# Patient Record
Sex: MALE | Race: WHITE | Employment: OTHER | URBAN - METROPOLITAN AREA
[De-identification: names, ages, dates, MRNs, and addresses within clinical notes are randomized per-mention and may not be internally consistent; named-entity substitution may affect disease eponyms.]

---

## 2019-07-29 ENCOUNTER — HOSPITAL ENCOUNTER (OUTPATIENT)
Dept: CT IMAGING | Age: 74
Discharge: HOME | End: 2019-07-29
Attending: INTERNAL MEDICINE
Payer: MEDICARE

## 2019-07-29 DIAGNOSIS — R91.8 MULTIPLE PULMONARY NODULES: ICD-10-CM

## 2019-07-29 PROCEDURE — 71250 CT THORAX DX C-: CPT

## 2021-05-12 ENCOUNTER — TRANSCRIBE ORDER (OUTPATIENT)
Dept: SCHEDULING | Age: 76
End: 2021-05-12

## 2021-05-12 DIAGNOSIS — R91.8 MULTIPLE LUNG NODULES ON CT: Primary | ICD-10-CM

## 2024-03-14 ENCOUNTER — HOSPITAL ENCOUNTER (OUTPATIENT)
Facility: HOSPITAL | Age: 79
Discharge: HOME | End: 2024-03-14
Payer: MEDICARE

## 2024-03-14 VITALS
SYSTOLIC BLOOD PRESSURE: 149 MMHG | DIASTOLIC BLOOD PRESSURE: 75 MMHG | HEART RATE: 83 BPM | TEMPERATURE: 98.8 F | HEIGHT: 71 IN | WEIGHT: 233 LBS | BODY MASS INDEX: 32.62 KG/M2 | RESPIRATION RATE: 20 BRPM

## 2024-03-14 DIAGNOSIS — R63.4 WEIGHT LOSS, UNINTENTIONAL: ICD-10-CM

## 2024-03-14 DIAGNOSIS — L89.323 PRESSURE INJURY OF LEFT BUTTOCK, STAGE 3 (HCC): Primary | ICD-10-CM

## 2024-03-14 PROCEDURE — 99203 OFFICE O/P NEW LOW 30 MIN: CPT

## 2024-03-14 PROCEDURE — 11042 DBRDMT SUBQ TIS 1ST 20SQCM/<: CPT

## 2024-03-14 RX ORDER — CARVEDILOL 25 MG/1
25 TABLET ORAL
COMMUNITY
Start: 2024-02-23

## 2024-03-14 RX ORDER — MELOXICAM 15 MG/1
15 TABLET ORAL DAILY
COMMUNITY
Start: 2018-03-15

## 2024-03-14 RX ORDER — OMEPRAZOLE 40 MG/1
CAPSULE, DELAYED RELEASE ORAL
COMMUNITY
Start: 2018-01-14

## 2024-03-14 RX ORDER — LIDOCAINE 40 MG/G
CREAM TOPICAL ONCE
OUTPATIENT
Start: 2024-03-14 | End: 2024-03-14

## 2024-03-14 RX ORDER — GINSENG 100 MG
CAPSULE ORAL ONCE
OUTPATIENT
Start: 2024-03-14 | End: 2024-03-14

## 2024-03-14 RX ORDER — PNV NO.95/FERROUS FUM/FOLIC AC 28MG-0.8MG
1 TABLET ORAL DAILY
COMMUNITY
Start: 2024-01-01

## 2024-03-14 RX ORDER — MONTELUKAST SODIUM 10 MG/1
10 TABLET ORAL DAILY
COMMUNITY
Start: 2024-02-21

## 2024-03-14 RX ORDER — GENTAMICIN SULFATE 1 MG/G
OINTMENT TOPICAL ONCE
OUTPATIENT
Start: 2024-03-14 | End: 2024-03-14

## 2024-03-14 RX ORDER — SITAGLIPTIN 100 MG/1
TABLET, FILM COATED ORAL
COMMUNITY
Start: 2018-02-05

## 2024-03-14 RX ORDER — LIDOCAINE HYDROCHLORIDE 20 MG/ML
JELLY TOPICAL ONCE
OUTPATIENT
Start: 2024-03-14 | End: 2024-03-14

## 2024-03-14 RX ORDER — CLOBETASOL PROPIONATE 0.5 MG/G
OINTMENT TOPICAL ONCE
OUTPATIENT
Start: 2024-03-14 | End: 2024-03-14

## 2024-03-14 RX ORDER — MIRABEGRON 50 MG/1
50 TABLET, FILM COATED, EXTENDED RELEASE ORAL DAILY
COMMUNITY
Start: 2024-01-28

## 2024-03-14 RX ORDER — BACITRACIN ZINC AND POLYMYXIN B SULFATE 500; 1000 [USP'U]/G; [USP'U]/G
OINTMENT TOPICAL ONCE
OUTPATIENT
Start: 2024-03-14 | End: 2024-03-14

## 2024-03-14 RX ORDER — PRAVASTATIN SODIUM 20 MG
20 TABLET ORAL
COMMUNITY
Start: 2018-02-05

## 2024-03-14 RX ORDER — TRIAMCINOLONE ACETONIDE 1 MG/G
OINTMENT TOPICAL ONCE
OUTPATIENT
Start: 2024-03-14 | End: 2024-03-14

## 2024-03-14 RX ORDER — AMLODIPINE BESYLATE 10 MG/1
10 TABLET ORAL
COMMUNITY
Start: 2018-01-14

## 2024-03-14 RX ORDER — SODIUM CHLOR/HYPOCHLOROUS ACID 0.033 %
SOLUTION, IRRIGATION IRRIGATION ONCE
OUTPATIENT
Start: 2024-03-14 | End: 2024-03-14

## 2024-03-14 RX ORDER — LEVOTHYROXINE SODIUM 0.1 MG/1
100 TABLET ORAL
COMMUNITY

## 2024-03-14 RX ORDER — BETAMETHASONE DIPROPIONATE 0.5 MG/G
CREAM TOPICAL ONCE
OUTPATIENT
Start: 2024-03-14 | End: 2024-03-14

## 2024-03-14 RX ORDER — IBUPROFEN 200 MG
TABLET ORAL ONCE
OUTPATIENT
Start: 2024-03-14 | End: 2024-03-14

## 2024-03-14 RX ORDER — LIDOCAINE HYDROCHLORIDE 40 MG/ML
SOLUTION TOPICAL ONCE
OUTPATIENT
Start: 2024-03-14 | End: 2024-03-14

## 2024-03-14 RX ORDER — FLUTICASONE PROPIONATE AND SALMETEROL 100; 50 UG/1; UG/1
POWDER RESPIRATORY (INHALATION)
COMMUNITY
Start: 2021-07-15

## 2024-03-14 RX ORDER — LIDOCAINE 50 MG/G
OINTMENT TOPICAL ONCE
OUTPATIENT
Start: 2024-03-14 | End: 2024-03-14
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
## 2024-03-14 NOTE — FLOWSHEET NOTE
03/14/24 1542   Wound 03/14/24 Buttocks Left #1   Date First Assessed/Time First Assessed: 03/14/24 1504   Present on Original Admission: Yes  Wound Approximate Age at First Assessment (Weeks): (c)   Primary Wound Type: Pressure Injury  Location: Buttocks  Wound Location Orientation: Left  Wound Desc...   Dressing Status New dressing applied   Dressing/Treatment Other (comment)  (skin prep, duoderm)     Discharge Condition: Stable    Pain: 0    Ambulatory Status:Walking    Discharge Destination: Home    Transportation:Car    Accompanied by: Self    Discharge instructions reviewed with Self and copy or written instructions have been provided. All questions/concerns have been addressed at this time.

## 2024-03-14 NOTE — FLOWSHEET NOTE
03/14/24 1505   Wound 03/14/24 Buttocks Left #1   Date First Assessed/Time First Assessed: 03/14/24 1504   Present on Original Admission: Yes  Wound Approximate Age at First Assessment (Weeks): (c)   Primary Wound Type: Pressure Injury  Location: Buttocks  Wound Location Orientation: Left  Wound Desc...   Wound Image    Wound Cleansed Cleansed with saline   Wound Length (cm) 2 cm   Wound Width (cm) 1.2 cm   Wound Depth (cm) 0.2 cm   Wound Surface Area (cm^2) 2.4 cm^2   Wound Volume (cm^3) 0.48 cm^3   Wound Assessment Slough;Pink/red   Drainage Amount Scant (moist but unmeasurable)   Drainage Description Serosanguinous   Odor None   Negrita-wound Assessment Blanchable erythema   Margins Epibole (rolled edges);Flat/open edges   Wound Thickness Description not for Pressure Injury Full thickness     BP (!) 149/75   Pulse 83   Temp 98.8 °F (37.1 °C) (Temporal)   Resp 20   Ht 1.803 m (5' 11\")   Wt 105.7 kg (233 lb)   BMI 32.50 kg/m²

## 2024-03-14 NOTE — PLAN OF CARE
(rolled edges);Flat/open edges 03/14/24 1505   Wound Thickness Description not for Pressure Injury Full thickness 03/14/24 1505   Number of days: 0          Supplies Requested :      WOUND #: 1   PRIMARY DRESSING:    Hydrocolloid thin - Duoderm brand is possible        FREQUENCY OF DRESSING CHANGES:  Three times per week       ADDITIONAL ITEMS:  [] Gloves Small  [x] Gloves Medium [] Gloves Large [] Gloves XLarge  [] Tape 1\" [x] Tape 2\" [] Tape 3\"  [] Medipore Tape  [x] Saline  [] Vashe  [x] Skin Prep   [] Adhesive Remover   [] Cotton Tip Applicators   [] Other:    Patient Wound(s) Debrided: [x] Yes if yes please add date 3/14/2024   [] No    Debribement Type: Excisional/Sharp    Patient currently being seen by Home Health: [] Yes   [x] No    Duration for needed supplies:  []15  [x]30  []60  []90 Days    Electronically signed by Barbara Stack RN on 3/14/2024 at 3:36 PM     Provider Information:      PROVIDER'S NAME: Nohemi Mark MD    NPI: [id number]

## 2024-03-14 NOTE — PATIENT INSTRUCTIONS
position directing pressure on wound site.      Dietary:  [x] Diabetic Diet   [x] Increase Protein: examples (Meat, cheese, eggs, greek yogurt, fish, nuts)   [] Boby Therapeutic Nutrition Powder  [] Dial a Dietician : Call [employer] at [phone] enter code (249) when prompted. M-F 9am-5pm EST.     Return Appointment:  [] Nurse Visit at wound center   [x] Return Appointment: With Dr. Nohemi Mark in 1 week(s)  [] Ordered tests:     Electronically signed on 3/14/2024 at 3:24 PM     PLEASE NOTE: IF YOU ARE UNABLE TO OBTAIN WOUND SUPPLIES, CONTINUE TO USE THE SUPPLIES YOU HAVE AVAILABLE UNTIL YOU ARE ABLE TO REACH US. IT IS MOST IMPORTANT TO KEEP THE WOUND COVERED AT ALL TIMES.     Physician Signature:_______________________  Dr.Silky Mark

## 2024-03-21 ENCOUNTER — HOSPITAL ENCOUNTER (OUTPATIENT)
Facility: HOSPITAL | Age: 79
Discharge: HOME | End: 2024-03-21
Payer: MEDICARE

## 2024-03-21 VITALS
SYSTOLIC BLOOD PRESSURE: 126 MMHG | DIASTOLIC BLOOD PRESSURE: 60 MMHG | TEMPERATURE: 97.9 F | RESPIRATION RATE: 18 BRPM | HEART RATE: 84 BPM

## 2024-03-21 DIAGNOSIS — L89.323 PRESSURE INJURY OF LEFT BUTTOCK, STAGE 3 (HCC): Primary | ICD-10-CM

## 2024-03-21 PROCEDURE — 11042 DBRDMT SUBQ TIS 1ST 20SQCM/<: CPT

## 2024-03-21 RX ORDER — LIDOCAINE HYDROCHLORIDE 40 MG/ML
SOLUTION TOPICAL ONCE
OUTPATIENT
Start: 2024-03-21 | End: 2024-03-21

## 2024-03-21 RX ORDER — SODIUM CHLOR/HYPOCHLOROUS ACID 0.033 %
SOLUTION, IRRIGATION IRRIGATION ONCE
OUTPATIENT
Start: 2024-03-21 | End: 2024-03-21

## 2024-03-21 RX ORDER — BACITRACIN ZINC AND POLYMYXIN B SULFATE 500; 1000 [USP'U]/G; [USP'U]/G
OINTMENT TOPICAL ONCE
OUTPATIENT
Start: 2024-03-21 | End: 2024-03-21

## 2024-03-21 RX ORDER — GENTAMICIN SULFATE 1 MG/G
OINTMENT TOPICAL ONCE
OUTPATIENT
Start: 2024-03-21 | End: 2024-03-21

## 2024-03-21 RX ORDER — LIDOCAINE HYDROCHLORIDE 20 MG/ML
JELLY TOPICAL ONCE
OUTPATIENT
Start: 2024-03-21 | End: 2024-03-21

## 2024-03-21 RX ORDER — GINSENG 100 MG
CAPSULE ORAL ONCE
OUTPATIENT
Start: 2024-03-21 | End: 2024-03-21

## 2024-03-21 RX ORDER — CLOBETASOL PROPIONATE 0.5 MG/G
OINTMENT TOPICAL ONCE
OUTPATIENT
Start: 2024-03-21 | End: 2024-03-21

## 2024-03-21 RX ORDER — IBUPROFEN 200 MG
TABLET ORAL ONCE
OUTPATIENT
Start: 2024-03-21 | End: 2024-03-21

## 2024-03-21 RX ORDER — TRIAMCINOLONE ACETONIDE 1 MG/G
OINTMENT TOPICAL ONCE
OUTPATIENT
Start: 2024-03-21 | End: 2024-03-21

## 2024-03-21 RX ORDER — BETAMETHASONE DIPROPIONATE 0.5 MG/G
CREAM TOPICAL ONCE
OUTPATIENT
Start: 2024-03-21 | End: 2024-03-21

## 2024-03-21 RX ORDER — LIDOCAINE 40 MG/G
CREAM TOPICAL ONCE
OUTPATIENT
Start: 2024-03-21 | End: 2024-03-21

## 2024-03-21 RX ORDER — LIDOCAINE 50 MG/G
OINTMENT TOPICAL ONCE
OUTPATIENT
Start: 2024-03-21 | End: 2024-03-21
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
## 2024-03-21 NOTE — FLOWSHEET NOTE
03/21/24 1127   Anesthetic   Anesthetic 4% Lidocaine Liquid Topical   Wound 03/14/24 Buttocks Left #1   Date First Assessed/Time First Assessed: 03/14/24 1504   Present on Original Admission: Yes  Wound Approximate Age at First Assessment (Weeks): (c)   Primary Wound Type: Pressure Injury  Location: Buttocks  Wound Location Orientation: Left  Wound Desc...   Wound Image    Dressing Status Old drainage noted   Wound Cleansed Cleansed with saline   Wound Length (cm) 1.7 cm   Wound Width (cm) 1.3 cm   Wound Depth (cm) 0.2 cm   Wound Surface Area (cm^2) 2.21 cm^2   Change in Wound Size % (l*w) 7.92   Wound Volume (cm^3) 0.442 cm^3   Wound Healing % 8   Wound Assessment Slough;Pink/red   Drainage Amount Small (< 25%)   Drainage Description Serous   Odor None   Negrita-wound Assessment Blanchable erythema   Margins Flat/open edges   Wound Thickness Description not for Pressure Injury Full thickness     /60   Pulse 84   Temp 97.9 °F (36.6 °C) (Temporal)   Resp 18

## 2024-03-21 NOTE — PATIENT INSTRUCTIONS
position directing pressure on wound site.      Dietary:  [x] Diabetic Diet   [x] Increase Protein: examples (Meat, cheese, eggs, greek yogurt, fish, nuts)   [] Boby Therapeutic Nutrition Powder  [] Dial a Dietician : Call [employer] at [phone] enter code (249) when prompted. M-F 9am-5pm EST.     Return Appointment:  [] Nurse Visit at wound center   [x] Return Appointment: With Dr. Nohemi Mark in 1 week(s)  [] Ordered tests:     Electronically signed on 3/21/2024 at 11:46 AM     PLEASE NOTE: IF YOU ARE UNABLE TO OBTAIN WOUND SUPPLIES, CONTINUE TO USE THE SUPPLIES YOU HAVE AVAILABLE UNTIL YOU ARE ABLE TO REACH US. IT IS MOST IMPORTANT TO KEEP THE WOUND COVERED AT ALL TIMES.     Physician Signature:_______________________  Dr.Silky Mark

## 2024-03-28 ENCOUNTER — HOSPITAL ENCOUNTER (OUTPATIENT)
Facility: HOSPITAL | Age: 79
Discharge: HOME | End: 2024-03-28
Payer: MEDICARE

## 2024-03-28 VITALS
HEART RATE: 92 BPM | DIASTOLIC BLOOD PRESSURE: 74 MMHG | RESPIRATION RATE: 17 BRPM | TEMPERATURE: 97 F | SYSTOLIC BLOOD PRESSURE: 154 MMHG

## 2024-03-28 DIAGNOSIS — L89.323 PRESSURE INJURY OF LEFT BUTTOCK, STAGE 3 (HCC): Primary | ICD-10-CM

## 2024-03-28 PROCEDURE — 11042 DBRDMT SUBQ TIS 1ST 20SQCM/<: CPT

## 2024-03-28 RX ORDER — GENTAMICIN SULFATE 1 MG/G
OINTMENT TOPICAL ONCE
OUTPATIENT
Start: 2024-03-28 | End: 2024-03-28

## 2024-03-28 RX ORDER — GINSENG 100 MG
CAPSULE ORAL ONCE
OUTPATIENT
Start: 2024-03-28 | End: 2024-03-28

## 2024-03-28 RX ORDER — CLOBETASOL PROPIONATE 0.5 MG/G
OINTMENT TOPICAL ONCE
OUTPATIENT
Start: 2024-03-28 | End: 2024-03-28

## 2024-03-28 RX ORDER — LIDOCAINE 50 MG/G
OINTMENT TOPICAL ONCE
OUTPATIENT
Start: 2024-03-28 | End: 2024-03-28

## 2024-03-28 RX ORDER — IBUPROFEN 200 MG
TABLET ORAL ONCE
OUTPATIENT
Start: 2024-03-28 | End: 2024-03-28

## 2024-03-28 RX ORDER — LIDOCAINE HYDROCHLORIDE 20 MG/ML
JELLY TOPICAL ONCE
OUTPATIENT
Start: 2024-03-28 | End: 2024-03-28

## 2024-03-28 RX ORDER — LIDOCAINE HYDROCHLORIDE 40 MG/ML
SOLUTION TOPICAL ONCE
OUTPATIENT
Start: 2024-03-28 | End: 2024-03-28

## 2024-03-28 RX ORDER — BACITRACIN ZINC AND POLYMYXIN B SULFATE 500; 1000 [USP'U]/G; [USP'U]/G
OINTMENT TOPICAL ONCE
OUTPATIENT
Start: 2024-03-28 | End: 2024-03-28

## 2024-03-28 RX ORDER — LIDOCAINE 40 MG/G
CREAM TOPICAL ONCE
OUTPATIENT
Start: 2024-03-28 | End: 2024-03-28

## 2024-03-28 RX ORDER — BETAMETHASONE DIPROPIONATE 0.5 MG/G
CREAM TOPICAL ONCE
OUTPATIENT
Start: 2024-03-28 | End: 2024-03-28

## 2024-03-28 RX ORDER — TRIAMCINOLONE ACETONIDE 1 MG/G
OINTMENT TOPICAL ONCE
OUTPATIENT
Start: 2024-03-28 | End: 2024-03-28

## 2024-03-28 RX ORDER — SODIUM CHLOR/HYPOCHLOROUS ACID 0.033 %
SOLUTION, IRRIGATION IRRIGATION ONCE
OUTPATIENT
Start: 2024-03-28 | End: 2024-03-28

## 2024-03-28 NOTE — FLOWSHEET NOTE
03/28/24 1139   Wound 03/14/24 Buttocks Left #1   Date First Assessed/Time First Assessed: 03/14/24 1504   Present on Original Admission: Yes  Wound Approximate Age at First Assessment (Weeks): (c)   Primary Wound Type: Pressure Injury  Location: Buttocks  Wound Location Orientation: Left  Wound Desc...   Dressing/Treatment Hydrocolloid     Discharge Condition: Stable    Pain: 0    Ambulatory Status:Walking    Discharge Destination: Home    Transportation:Car    Accompanied by: Self    Discharge instructions reviewed with Self and copy or written instructions have been provided. All questions/concerns have been addressed at this time.

## 2024-03-28 NOTE — FLOWSHEET NOTE
03/28/24 1102   Anesthetic   Anesthetic 4% Lidocaine Liquid Topical   Wound 03/14/24 Buttocks Left #1   Date First Assessed/Time First Assessed: 03/14/24 1504   Present on Original Admission: Yes  Wound Approximate Age at First Assessment (Weeks): (c)   Primary Wound Type: Pressure Injury  Location: Buttocks  Wound Location Orientation: Left  Wound Desc...   Wound Image    Dressing Status Old drainage noted   Wound Cleansed Soap and water   Wound Length (cm) 1.2 cm   Wound Width (cm) 0.6 cm   Wound Depth (cm) 0.1 cm   Wound Surface Area (cm^2) 0.72 cm^2   Change in Wound Size % (l*w) 70   Wound Volume (cm^3) 0.072 cm^3   Wound Healing % 85   Wound Assessment Slough;Pink/red   Drainage Amount Scant (moist but unmeasurable)   Drainage Description Serous   Odor None   Negrita-wound Assessment Blanchable erythema;Maceration   Margins Epibole (rolled edges)   Wound Thickness Description not for Pressure Injury Partial thickness     BP (!) 154/74   Pulse 92   Temp 97 °F (36.1 °C) (Temporal)   Resp 17

## 2024-03-28 NOTE — PATIENT INSTRUCTIONS
position directing pressure on wound site.      Dietary:  [x] Diabetic Diet   [x] Increase Protein: examples (Meat, cheese, eggs, greek yogurt, fish, nuts)   [] Boby Therapeutic Nutrition Powder  [] Dial a Dietician : Call [employer] at [phone] enter code (249) when prompted. M-F 9am-5pm EST.     Return Appointment:  [] Nurse Visit at wound center   [x] Return Appointment: With Dr. Nohemi Mark in 1 week(s)  [] Ordered tests:     Electronically signed on 3/28/2024 at 8:01 AM     PLEASE NOTE: IF YOU ARE UNABLE TO OBTAIN WOUND SUPPLIES, CONTINUE TO USE THE SUPPLIES YOU HAVE AVAILABLE UNTIL YOU ARE ABLE TO REACH US. IT IS MOST IMPORTANT TO KEEP THE WOUND COVERED AT ALL TIMES.     Physician Signature:_______________________  Dr.Silky Mark

## 2024-03-28 NOTE — PROGRESS NOTES
good  Psych: cooperative. No anxiety or depression. Normal mood and affect.  Neuro: alert and oriented to person/place/situation. Otherwise nonfocal.  Derm: Normal  turgor for age, dry skin  HEENT: Normocephalic, atraumatic. EOMI. Conjunctiva clear. No scleral icterus.  Neck: Normal range of motion.   Chest: Respirations nonlabored  Lower extremities: color normal; temperature normal.   Ulcer Description:   See Flowsheet           Data Review:              Procedure:     Ulcer assessment: Due to presence of necrotic tissue within the wound bed, ulcer requires debridement.    Procedure: Debridement:   The indication for debridement was reviewed with patient. Risks of procedure (bleeding, infection, pain) were discussed with patient/ and consent signed on first visit. Questions were answered      Subcutaneous excisional debridement Left buttock ulcer   Indication: to remove necrotic tissue/ vitalized and devitalized tissue/ infected tissue through skin and subcutaneous layer of wound bed  Time out done  Consent in chart   Anesthesia: Topical  lidocaine   Instrument: curette   Residual Necrosis: Present and scored   Bleeding: <1ml   Hemostasis: Pressure   Patient tolerated procedure well   Procedural Pain: none  Post - procedural pain: none    Post debridement measurements: see below  Surface area debrided: 0.72sq. cm        -------  Wound 03/14/24 Buttocks Left #1 (Active)   Wound Image   03/28/24 1102   Wound Etiology Pressure Stage 3 03/14/24 1522   Dressing Status Old drainage noted 03/28/24 1102   Wound Cleansed Soap and water 03/28/24 1102   Dressing/Treatment Hydrocolloid 03/28/24 1139   Wound Length (cm) 1.2 cm 03/28/24 1102   Wound Width (cm) 0.6 cm 03/28/24 1102   Wound Depth (cm) 0.1 cm 03/28/24 1102   Wound Surface Area (cm^2) 0.72 cm^2 03/28/24 1102   Change in Wound Size % (l*w) 70 03/28/24 1102   Wound Volume (cm^3) 0.072 cm^3 03/28/24 1102   Wound Healing % 85 03/28/24 1102   Post-Procedure Length (cm)

## 2024-04-04 ENCOUNTER — HOSPITAL ENCOUNTER (OUTPATIENT)
Facility: HOSPITAL | Age: 79
Discharge: HOME | End: 2024-04-04
Payer: MEDICARE

## 2024-04-04 VITALS
DIASTOLIC BLOOD PRESSURE: 79 MMHG | RESPIRATION RATE: 16 BRPM | SYSTOLIC BLOOD PRESSURE: 132 MMHG | HEART RATE: 87 BPM | TEMPERATURE: 97.7 F

## 2024-04-04 DIAGNOSIS — L89.323 PRESSURE INJURY OF LEFT BUTTOCK, STAGE 3 (HCC): Primary | ICD-10-CM

## 2024-04-04 PROCEDURE — 97597 DBRDMT OPN WND 1ST 20 CM/<: CPT

## 2024-04-04 RX ORDER — CLOBETASOL PROPIONATE 0.5 MG/G
OINTMENT TOPICAL ONCE
OUTPATIENT
Start: 2024-04-04 | End: 2024-04-04

## 2024-04-04 RX ORDER — GINSENG 100 MG
CAPSULE ORAL ONCE
OUTPATIENT
Start: 2024-04-04 | End: 2024-04-04

## 2024-04-04 RX ORDER — TRIAMCINOLONE ACETONIDE 1 MG/G
OINTMENT TOPICAL ONCE
OUTPATIENT
Start: 2024-04-04 | End: 2024-04-04

## 2024-04-04 RX ORDER — SODIUM CHLOR/HYPOCHLOROUS ACID 0.033 %
SOLUTION, IRRIGATION IRRIGATION ONCE
OUTPATIENT
Start: 2024-04-04 | End: 2024-04-04

## 2024-04-04 RX ORDER — LIDOCAINE HYDROCHLORIDE 20 MG/ML
JELLY TOPICAL ONCE
OUTPATIENT
Start: 2024-04-04 | End: 2024-04-04

## 2024-04-04 RX ORDER — BACITRACIN ZINC AND POLYMYXIN B SULFATE 500; 1000 [USP'U]/G; [USP'U]/G
OINTMENT TOPICAL ONCE
OUTPATIENT
Start: 2024-04-04 | End: 2024-04-04

## 2024-04-04 RX ORDER — LIDOCAINE HYDROCHLORIDE 40 MG/ML
SOLUTION TOPICAL ONCE
OUTPATIENT
Start: 2024-04-04 | End: 2024-04-04

## 2024-04-04 RX ORDER — LIDOCAINE 40 MG/G
CREAM TOPICAL ONCE
OUTPATIENT
Start: 2024-04-04 | End: 2024-04-04

## 2024-04-04 RX ORDER — BETAMETHASONE DIPROPIONATE 0.5 MG/G
CREAM TOPICAL ONCE
OUTPATIENT
Start: 2024-04-04 | End: 2024-04-04

## 2024-04-04 RX ORDER — GENTAMICIN SULFATE 1 MG/G
OINTMENT TOPICAL ONCE
OUTPATIENT
Start: 2024-04-04 | End: 2024-04-04

## 2024-04-04 RX ORDER — LIDOCAINE 50 MG/G
OINTMENT TOPICAL ONCE
OUTPATIENT
Start: 2024-04-04 | End: 2024-04-04

## 2024-04-04 RX ORDER — IBUPROFEN 200 MG
TABLET ORAL ONCE
OUTPATIENT
Start: 2024-04-04 | End: 2024-04-04

## 2024-04-04 NOTE — FLOWSHEET NOTE
04/04/24 1206   Wound 03/14/24 Buttocks Left #1   Date First Assessed/Time First Assessed: 03/14/24 1504   Present on Original Admission: Yes  Wound Approximate Age at First Assessment (Weeks): (c)   Primary Wound Type: Pressure Injury  Location: Buttocks  Wound Location Orientation: Left  Wound Desc...   Dressing Status New dressing applied   Dressing/Treatment Other (comment)  (Skin prep; Duoderm)   Pain Assessment   Pain Assessment None - Denies Pain     Discharge Condition: Stable    Pain: 0    Ambulatory Status:Walking    Discharge Destination: Home    Transportation:Car    Accompanied by: Self    Discharge instructions reviewed with Self and copy or written instructions have been provided. All questions/concerns have been addressed at this time.

## 2024-04-04 NOTE — PATIENT INSTRUCTIONS
Discharge Instructions for  [facility]  [address] [place] [address]  Telephone: [phone]     FAX [phone]    Wound Care Center Information: Should you experience any significant changes in your wound(s) or have questions about your wound care, please contact the [facility] at MONDAY - FRIDAY 8:00 am - 4:30.  If you need help with your wound outside these hours and cannot wait until we are again available, contact your PCP or go to the hospital emergency room.     NAME:  Matt Conner  YOB: 1945  DATE:  4/4/2024    : Barbara     [x] Wound and dressing supply provider: [employer]    Look into Gel Cushions such as the purple cushion on [employer]    Wound Cleansing:   Do not scrub or use excessive force.  Cleanse wound prior to applying a clean dressing with:  [] Normal Saline   [] Keep Wound Dry in Shower - may purchase a cast cover at local pharmacy     [] Cleanse wound with Mild Soap & Water    [x] May Shower: coordinate with dressing changes, remove dressing 1st, wash with mild soap and water, pat dry, and redress wound right after with a new dressing  [] Do not shower  [] cleanse with baby shampoo lather leave 2-3 then rinse with water    Topical Treatments:  Do not apply lotions, creams, or ointments to wound bed unless directed.   [] Apply moisturizing lotion to skin surrounding the wound prior to dressing change.  [] Other:     Dressings:           Wound Location Left Buttocks     Apply Primary Dressing:      [x] Skin prep to intact skin, Duoderm      Change dressing:   [] Daily      [] Every Other Day   [x] Three times per week (Tueday, Thursday, Saturday)  [] Once a week  [] Other:    Pressure Relief: Try to return to bed throughout the day and lay on your sides to relieve pressure   [x] When sitting, shift position or do seat lifts every 15 minutes.  [x] Turn every 2 hours when in bed.  Avoid
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
## 2024-04-04 NOTE — FLOWSHEET NOTE
04/04/24 1153   Anesthetic   Anesthetic 4% Lidocaine Liquid Topical   Wound 03/14/24 Buttocks Left #1   Date First Assessed/Time First Assessed: 03/14/24 1504   Present on Original Admission: Yes  Wound Approximate Age at First Assessment (Weeks): (c)   Primary Wound Type: Pressure Injury  Location: Buttocks  Wound Location Orientation: Left  Wound Desc...   Wound Image    Wound Etiology Pressure Stage 3   Dressing Status Old drainage noted   Wound Cleansed Cleansed with saline   Wound Length (cm) 0.8 cm   Wound Width (cm) 0.6 cm   Wound Depth (cm) 0.1 cm   Wound Surface Area (cm^2) 0.48 cm^2   Change in Wound Size % (l*w) 80   Wound Volume (cm^3) 0.048 cm^3   Wound Healing % 90   Wound Assessment Risco/red;Slough   Drainage Amount Small (< 25%)   Drainage Description Serous   Odor None   Negrita-wound Assessment Fragile;Blanchable erythema   Margins Flat/open edges   Pain Assessment   Pain Assessment None - Denies Pain     /79   Pulse 87   Temp 97.7 °F (36.5 °C) (Temporal)   Resp 16

## 2024-04-11 ENCOUNTER — HOSPITAL ENCOUNTER (OUTPATIENT)
Facility: HOSPITAL | Age: 79
Discharge: HOME | End: 2024-04-11
Payer: MEDICARE

## 2024-04-11 VITALS
RESPIRATION RATE: 18 BRPM | DIASTOLIC BLOOD PRESSURE: 64 MMHG | HEART RATE: 79 BPM | SYSTOLIC BLOOD PRESSURE: 116 MMHG | TEMPERATURE: 97.9 F

## 2024-04-11 DIAGNOSIS — L89.323 PRESSURE INJURY OF LEFT BUTTOCK, STAGE 3 (HCC): Primary | ICD-10-CM

## 2024-04-11 PROCEDURE — 99212 OFFICE O/P EST SF 10 MIN: CPT

## 2024-04-11 RX ORDER — BACITRACIN ZINC AND POLYMYXIN B SULFATE 500; 1000 [USP'U]/G; [USP'U]/G
OINTMENT TOPICAL ONCE
OUTPATIENT
Start: 2024-04-11 | End: 2024-04-11

## 2024-04-11 RX ORDER — LIDOCAINE 40 MG/G
CREAM TOPICAL ONCE
OUTPATIENT
Start: 2024-04-11 | End: 2024-04-11

## 2024-04-11 RX ORDER — IBUPROFEN 200 MG
TABLET ORAL ONCE
OUTPATIENT
Start: 2024-04-11 | End: 2024-04-11

## 2024-04-11 RX ORDER — TRIAMCINOLONE ACETONIDE 1 MG/G
OINTMENT TOPICAL ONCE
OUTPATIENT
Start: 2024-04-11 | End: 2024-04-11

## 2024-04-11 RX ORDER — LIDOCAINE HYDROCHLORIDE 40 MG/ML
SOLUTION TOPICAL ONCE
OUTPATIENT
Start: 2024-04-11 | End: 2024-04-11

## 2024-04-11 RX ORDER — SODIUM CHLOR/HYPOCHLOROUS ACID 0.033 %
SOLUTION, IRRIGATION IRRIGATION ONCE
OUTPATIENT
Start: 2024-04-11 | End: 2024-04-11

## 2024-04-11 RX ORDER — LIDOCAINE HYDROCHLORIDE 20 MG/ML
JELLY TOPICAL ONCE
OUTPATIENT
Start: 2024-04-11 | End: 2024-04-11

## 2024-04-11 RX ORDER — GINSENG 100 MG
CAPSULE ORAL ONCE
OUTPATIENT
Start: 2024-04-11 | End: 2024-04-11

## 2024-04-11 RX ORDER — BETAMETHASONE DIPROPIONATE 0.5 MG/G
CREAM TOPICAL ONCE
OUTPATIENT
Start: 2024-04-11 | End: 2024-04-11

## 2024-04-11 RX ORDER — CLOBETASOL PROPIONATE 0.5 MG/G
OINTMENT TOPICAL ONCE
OUTPATIENT
Start: 2024-04-11 | End: 2024-04-11

## 2024-04-11 RX ORDER — LIDOCAINE 50 MG/G
OINTMENT TOPICAL ONCE
OUTPATIENT
Start: 2024-04-11 | End: 2024-04-11

## 2024-04-11 RX ORDER — GENTAMICIN SULFATE 1 MG/G
OINTMENT TOPICAL ONCE
OUTPATIENT
Start: 2024-04-11 | End: 2024-04-11

## 2024-04-11 NOTE — FLOWSHEET NOTE
04/11/24 1413   Anesthetic   Anesthetic 4% Lidocaine Liquid Topical   Wound 03/14/24 Buttocks Left #1   Date First Assessed/Time First Assessed: 03/14/24 1504   Present on Original Admission: Yes  Wound Approximate Age at First Assessment (Weeks): (c)   Primary Wound Type: Pressure Injury  Location: Buttocks  Wound Location Orientation: Left  Wound Desc...   Wound Image    Dressing Status Old drainage noted   Wound Cleansed Cleansed with saline   Wound Length (cm) 1 cm   Wound Width (cm) 0.4 cm   Wound Depth (cm) 0 cm   Wound Surface Area (cm^2) 0.4 cm^2   Change in Wound Size % (l*w) 83.33   Wound Volume (cm^3) 0 cm^3   Wound Healing % 100   Wound Assessment Ormsby/red;Slough   Drainage Amount Scant (moist but unmeasurable)   Drainage Description Serous   Negrita-wound Assessment Fragile;Blanchable erythema   Margins Flat/open edges   Wound Thickness Description not for Pressure Injury Partial thickness     /64   Pulse 79   Temp 97.9 °F (36.6 °C) (Temporal)   Resp 18

## 2024-04-11 NOTE — FLOWSHEET NOTE
04/11/24 1435   Wound 03/14/24 Buttocks Left #1   Date First Assessed/Time First Assessed: 03/14/24 1504   Present on Original Admission: Yes  Wound Approximate Age at First Assessment (Weeks): (c)   Primary Wound Type: Pressure Injury  Location: Buttocks  Wound Location Orientation: Left  Wound Desc...   Dressing/Treatment Hydrocolloid  (skip prep)     Discharge Condition: Stable    Pain: 0    Ambulatory Status:Walking    Discharge Destination: Home    Transportation:Car    Accompanied by: Self    Discharge instructions reviewed with Self and copy or written instructions have been provided. All questions/concerns have been addressed at this time.

## 2024-04-11 NOTE — PROGRESS NOTES
Socioeconomic History    Marital status:    Occupational History    Occupation: retired   Tobacco Use    Smoking status: Never     Passive exposure: Never    Smokeless tobacco: Never   Vaping Use    Vaping Use: Never used   Substance and Sexual Activity    Alcohol use: Yes     Alcohol/week: 1.0 standard drink of alcohol     Types: 1 Cans of beer per week     Comment: 1 can of beer per work    Drug use: Never        Prior to Admission medications    Medication Sig Start Date End Date Taking? Authorizing Provider   amLODIPine (NORVASC) 10 MG tablet 1 tablet 1/14/18   Aubrey Williamson MD   carvedilol (COREG) 25 MG tablet 1 tablet 2/23/24   Aubrey Williamson MD   Ferrous Sulfate (IRON) 325 (65 Fe) MG TABS Take 1 tablet by mouth daily 1/1/24   Aubrey Williamson MD   fluticasone-salmeterol (ADVAIR) 100-50 MCG/ACT AEPB diskus inhaler  7/15/21   Aubrey Williamson MD   levothyroxine (SYNTHROID) 100 MCG tablet 1 tablet    Aubrey Williamson MD   MYRBETRIQ 50 MG TB24 Take 50 mg by mouth daily 1/28/24   Aubrey Williamson MD   montelukast (SINGULAIR) 10 MG tablet Take 1 tablet by mouth daily 2/21/24   Aubrey Williamson MD   meloxicam (MOBIC) 15 MG tablet Take 1 tablet by mouth daily 3/15/18   Aubrey Williamson MD   omeprazole (PRILOSEC) 40 MG delayed release capsule  1/14/18   Aubrey Williamson MD   pravastatin (PRAVACHOL) 20 MG tablet 1 tablet 2/5/18   Aubrey Williamson MD   JANUVIA 100 MG tablet  2/5/18   Aubrey Williamson MD      No Known Allergies       Signed By: Nohemi Mark MD      04/11/24        TEDDY

## 2024-04-11 NOTE — PATIENT INSTRUCTIONS
Discharge Instructions for  [facility]  [address] [place] [address]  Telephone: [phone]     FAX [phone]    Wound Care Center Information: Should you experience any significant changes in your wound(s) or have questions about your wound care, please contact the [facility] at MONDAY - FRIDAY 8:00 am - 4:30.  If you need help with your wound outside these hours and cannot wait until we are again available, contact your PCP or go to the hospital emergency room.     NAME:  Matt Conner  YOB: 1945  DATE:  4/11/2024    : Barbara     [x] Wound and dressing supply provider: [employer]    Look into Gel Cushions such as the purple cushion on [employer]    Wound Cleansing:   Do not scrub or use excessive force.  Cleanse wound prior to applying a clean dressing with:  [] Normal Saline   [] Keep Wound Dry in Shower - may purchase a cast cover at local pharmacy     [] Cleanse wound with Mild Soap & Water    [x] May Shower: coordinate with dressing changes, remove dressing 1st, wash with mild soap and water, pat dry, and redress wound right after with a new dressing  [] Do not shower  [] cleanse with baby shampoo lather leave 2-3 then rinse with water    Topical Treatments:  Do not apply lotions, creams, or ointments to wound bed unless directed.   [] Apply moisturizing lotion to skin surrounding the wound prior to dressing change.  [] Other:     Dressings:           Wound Location Left Buttocks     Apply Primary Dressing:      [x] Skin prep to intact skin, Duoderm      Change dressing:   [] Daily      [] Every Other Day   [x] Three times per week (Tueday, Thursday, Saturday)  [] Once a week  [] Other:    Pressure Relief: Try to return to bed throughout the day and lay on your sides to relieve pressure   [x] When sitting, shift position or do seat lifts every 15 minutes.  [x] Turn every 2 hours when in bed.  Avoid

## 2024-04-18 ENCOUNTER — HOSPITAL ENCOUNTER (OUTPATIENT)
Facility: HOSPITAL | Age: 79
Discharge: HOME | End: 2024-04-18
Payer: MEDICARE

## 2024-04-18 VITALS
HEART RATE: 79 BPM | RESPIRATION RATE: 18 BRPM | SYSTOLIC BLOOD PRESSURE: 135 MMHG | DIASTOLIC BLOOD PRESSURE: 64 MMHG | TEMPERATURE: 97.1 F

## 2024-04-18 DIAGNOSIS — L89.323 PRESSURE INJURY OF LEFT BUTTOCK, STAGE 3 (HCC): Primary | ICD-10-CM

## 2024-04-18 PROCEDURE — 99211 OFF/OP EST MAY X REQ PHY/QHP: CPT

## 2024-04-18 RX ORDER — LIDOCAINE HYDROCHLORIDE 20 MG/ML
JELLY TOPICAL ONCE
Status: CANCELLED | OUTPATIENT
Start: 2024-04-18 | End: 2024-04-18

## 2024-04-18 RX ORDER — TRIAMCINOLONE ACETONIDE 1 MG/G
OINTMENT TOPICAL ONCE
Status: CANCELLED | OUTPATIENT
Start: 2024-04-18 | End: 2024-04-18

## 2024-04-18 RX ORDER — LIDOCAINE 40 MG/G
CREAM TOPICAL ONCE
Status: CANCELLED | OUTPATIENT
Start: 2024-04-18 | End: 2024-04-18

## 2024-04-18 RX ORDER — GINSENG 100 MG
CAPSULE ORAL ONCE
Status: CANCELLED | OUTPATIENT
Start: 2024-04-18 | End: 2024-04-18

## 2024-04-18 RX ORDER — IBUPROFEN 200 MG
TABLET ORAL ONCE
Status: CANCELLED | OUTPATIENT
Start: 2024-04-18 | End: 2024-04-18

## 2024-04-18 RX ORDER — BACITRACIN ZINC AND POLYMYXIN B SULFATE 500; 1000 [USP'U]/G; [USP'U]/G
OINTMENT TOPICAL ONCE
Status: CANCELLED | OUTPATIENT
Start: 2024-04-18 | End: 2024-04-18

## 2024-04-18 RX ORDER — SODIUM CHLOR/HYPOCHLOROUS ACID 0.033 %
SOLUTION, IRRIGATION IRRIGATION ONCE
Status: CANCELLED | OUTPATIENT
Start: 2024-04-18 | End: 2024-04-18

## 2024-04-18 RX ORDER — LIDOCAINE HYDROCHLORIDE 40 MG/ML
SOLUTION TOPICAL ONCE
Status: CANCELLED | OUTPATIENT
Start: 2024-04-18 | End: 2024-04-18

## 2024-04-18 RX ORDER — BETAMETHASONE DIPROPIONATE 0.5 MG/G
CREAM TOPICAL ONCE
Status: CANCELLED | OUTPATIENT
Start: 2024-04-18 | End: 2024-04-18

## 2024-04-18 RX ORDER — GENTAMICIN SULFATE 1 MG/G
OINTMENT TOPICAL ONCE
Status: CANCELLED | OUTPATIENT
Start: 2024-04-18 | End: 2024-04-18

## 2024-04-18 RX ORDER — LIDOCAINE 50 MG/G
OINTMENT TOPICAL ONCE
Status: CANCELLED | OUTPATIENT
Start: 2024-04-18 | End: 2024-04-18

## 2024-04-18 RX ORDER — CLOBETASOL PROPIONATE 0.5 MG/G
OINTMENT TOPICAL ONCE
Status: CANCELLED | OUTPATIENT
Start: 2024-04-18 | End: 2024-04-18

## 2024-04-18 NOTE — WOUND CARE
Discharge Condition: Stable    Pain: 0    Ambulatory Status: None    Discharge Destination: home    Transportation:car    Accompanied by: SELF    Discharge instructions reviewed with SELF and copy or written instructions have been provided. All questions/concerns have been addressed at this time.

## 2024-04-18 NOTE — PATIENT INSTRUCTIONS
Discharge Instructions for  [facility]  [address] [place] [address]  Telephone: [phone]     FAX [phone]    Wound Care Center Information: Should you experience any significant changes in your wound(s) or have questions about your wound care, please contact the [facility] at MONDAY - FRIDAY 8:00 am - 4:30.  If you need help with your wound outside these hours and cannot wait until we are again available, contact your PCP or go to the hospital emergency room.     NAME:  Matt Conner  YOB: 1945  DATE:  4/18/2024    : Barbara     [x] May Shower    [x] Apply moisturizing lotion such as A&D ointment or Vaseline to healed skin daily        Pressure Relief:    [x] Continue use of gel cushion    [facility] THANK YOU    Your treatment has been completed at [facility] outpatient wound clinic on 4/18/2024, per Dr. Nohemi Mark.    We know patients have several options when choosing a health care provider. We would like to express our sincere appreciation for having had the chance to be yours. More importantly, we enjoy having you as part of our family.     We also hope your experience with us has surpassed your expectations and that you have been pleased with our service. We appreciate the confidence you've shown in selecting us as your health care provider and we will continue or commitment to provide the highest quality of service.      Again, thank you for choosing us for your health care needs. If you have any further questions or concerns, please call [phone]. It has been our pleasure serving you and we hope to continue our relationship in the future, if the need occurs.     Sincerely,  [facility] Team      Electronically signed on 4/18/2024 at 8:00 AM     Physician Signature:_______________________  Dr.Silky Mark

## 2024-04-18 NOTE — FLOWSHEET NOTE
04/18/24 1333   Wound 03/14/24 Buttocks Left #1   Date First Assessed/Time First Assessed: 03/14/24 1504   Present on Original Admission: Yes  Wound Approximate Age at First Assessment (Weeks): (c)   Primary Wound Type: Pressure Injury  Location: Buttocks  Wound Location Orientation: Left  Wound Desc...   Wound Image    Wound Length (cm) 0.1 cm   Wound Width (cm) 0.1 cm   Wound Depth (cm) 0.1 cm   Wound Surface Area (cm^2) 0.01 cm^2   Change in Wound Size % (l*w) 99.58   Wound Volume (cm^3) 0.001 cm^3   Wound Healing % 100   Wound Assessment Pink/red   Drainage Amount Scant (moist but unmeasurable)   Drainage Description Serous   Odor None   Negrita-wound Assessment Blanchable erythema     /64   Pulse 79   Temp 97.1 °F (36.2 °C) (Tympanic)   Resp 18
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
